# Patient Record
(demographics unavailable — no encounter records)

---

## 2024-12-02 NOTE — HISTORY OF PRESENT ILLNESS
[FreeTextEntry1] :  Prudencio: [id number]  Manuel is a 69-year-old right-handed man, originally from the [place]. He is presenting to clinic with wife Sweetie for memory  difficulties. He seems to minimize memory issues but Sweetie states they are prominent and noticeable to children.  She feels he is more forgetful than he should be for his age. He repeats the same question over and over again. He misplaces items often.  Not getting lost when driving. Not late on payments but payments are on automatic. Told that he is forgetful at work as a .  They have a lot of stressors including a child with special needs. He sleeps little, maybe 4-5 hours a night sometimes due to stress.  Primary care is at [facility].   Social History Lives in [place]  works as a   no smoking drinks one glass of beer a week no drug use  7th grade education in [place]    Family History Mother -  - 79, osteoporosis Father -  - 80 -dementia 6 children - Prader Willi Syndrome   Medications: none  Surgeries  appendectomy

## 2024-12-02 NOTE — PHYSICAL EXAM
[FreeTextEntry1] : Mental Status:  12/2/24: MOCA in Albanian: 0/5 memory Language/Speech : speech fluent, can name, can repeat  Cranial Nerves  II: Pupils equal and reactive,  VFF full III, IV, VI: EOMI V : normal sensation in V1-V3 b/l VII : no asymmetry, no nasolabial fold flattening VIII: normal hearing to voice  IX, X: normal palatal elevation, uvula midline XI: 5/5 head turn and 5/5 shoulder shrug bilaterally XII : midline tongue protrusion Motor: no drift in limbs, normal bulk and tone, 5/5 in all extremities Sensory: normal to light touch and vibration Reflexes: brachioradialis, biceps, triceps, patella and ankles 2+ bilaterally Toes are down-going  Coordination: Normal finger to nose Gait: normal balance and gait

## 2024-12-02 NOTE — DISCUSSION/SUMMARY
[FreeTextEntry1] : Manuel is a 69-year-old RH predominantly Micronesian-speaking gentleman presenting for mostly short term memory changes. Will get MRI brain as part of work up, labwork and neuropsych testing. Testing today MOCA 14/30 - suggestive of moderate cognitive impairment although testing was done with English to Micronesian translation which may be a confound. Would be best for him to get testing with a Micronesian speaking examiner.

## 2024-12-02 NOTE — ASSESSMENT
[FreeTextEntry1] : Please get neuropsychological testing with a Swedish speaking casey  Please get an MRI of the brain Please get labwork Return to clinic in 2-3 months to see Dr. Whyte  For brain health   - healthy eating/diet for memory cognition with diet rich in fiber, fruits and vegetables and low in saturated fats, processed foods.  - good sleep, 7-8 hours a night. No use of phone or watching television before bed.  - aerobic exercise, at least 30 minutes, such as a brisk walk 3-4 times a week.  - keep mind active with puzzles, reading , socializing with others.  - abstaining from excess alcohol, drug use.  - blood pressure and cholesterol monitoring , blood glucose monitoring to prevent microvascular disease which can lead to cognitive impairment.

## 2024-12-02 NOTE — HISTORY OF PRESENT ILLNESS
[FreeTextEntry1] :  Prudencio: [id number]  Manuel is a 69-year-old right-handed man, originally from the [place]. He is presenting to clinic with wife Sweetie for memory  difficulties. He seems to minimize memory issues but Sweetie states they are prominent and noticeable to children.  She feels he is more forgetful than he should be for his age. He repeats the same question over and over again. He misplaces items often.  Not getting lost when driving. Not late on payments but payments are on automatic. Told that he is forgetful at work as a .  They have a lot of stressors including a child with special needs. He sleeps little, maybe 4-5 hours a night sometimes due to stress.  Primary care is at [facility].   Social History Lives in [place] drives  works as a   no smoking drinks one glass of beer a week no drug use  7th grade education in [place]    Family History Mother -  - 79, osteoporosis Father -  - 80 -dementia 6 children - Prader Willi Syndrome   Medications: none  Surgeries  appendectomy

## 2024-12-02 NOTE — ASSESSMENT
[FreeTextEntry1] : Please get neuropsychological testing with a Telugu speaking casey  Please get an MRI of the brain Please get labwork Return to clinic in 2-3 months to see Dr. Whyte  For brain health   - healthy eating/diet for memory cognition with diet rich in fiber, fruits and vegetables and low in saturated fats, processed foods.  - good sleep, 7-8 hours a night. No use of phone or watching television before bed.  - aerobic exercise, at least 30 minutes, such as a brisk walk 3-4 times a week.  - keep mind active with puzzles, reading , socializing with others.  - abstaining from excess alcohol, drug use.  - blood pressure and cholesterol monitoring , blood glucose monitoring to prevent microvascular disease which can lead to cognitive impairment.

## 2024-12-02 NOTE — PHYSICAL EXAM
[FreeTextEntry1] : Mental Status:  12/2/24: MOCA in Swedish: 0/5 memory Language/Speech : speech fluent, can name, can repeat  Cranial Nerves  II: Pupils equal and reactive,  VFF full III, IV, VI: EOMI V : normal sensation in V1-V3 b/l VII : no asymmetry, no nasolabial fold flattening VIII: normal hearing to voice  IX, X: normal palatal elevation, uvula midline XI: 5/5 head turn and 5/5 shoulder shrug bilaterally XII : midline tongue protrusion Motor: no drift in limbs, normal bulk and tone, 5/5 in all extremities Sensory: normal to light touch and vibration Reflexes: brachioradialis, biceps, triceps, patella and ankles 2+ bilaterally Toes are down-going  Coordination: Normal finger to nose Gait: normal balance and gait

## 2024-12-02 NOTE — DISCUSSION/SUMMARY
[FreeTextEntry1] : Manuel is a 69-year-old RH predominantly Azerbaijani-speaking gentleman presenting for mostly short term memory changes. Will get MRI brain as part of work up, labwork and neuropsych testing. Testing today MOCA 14/30 - suggestive of moderate cognitive impairment although testing was done with English to Azerbaijani translation which may be a confound. Would be best for him to get testing with a Azerbaijani speaking examiner.

## 2025-04-10 NOTE — REASON FOR VISIT
[Follow-Up: _____] : a [unfilled] follow-up visit [[facility] ] : provided by [facility]   [Spouse] : spouse [Interpreters_IDNumber] : [id number] [Interpreters_FullName] : Nila

## 2025-04-10 NOTE — HISTORY OF PRESENT ILLNESS
[FreeTextEntry1] :  Presenting with spouse. Manuel thinks he is doing well but his wife thinks he is doing worse. He is more forgetful. Asking the same question over and over again. Did not get neuropsych testing completed with Ethiopian speaking casey. Here to review results of MRI brain.    Medications vitamin d 2000 units daily atorvastatin 10 mg?  ____________________________________________________________ 2024  Prudencio: [id number]  Manuel is a 69-year-old right-handed man, originally from [place]. He is presenting to clinic with wife Sweetie for memory  difficulties. He seems to minimize memory issues but Sweetie states they are prominent and noticeable to children.  She feels he is more forgetful than he should be for his age. He repeats the same question over and over again. He misplaces items often.  Not getting lost when driving. Not late on payments but payments are on automatic. Told that he is forgetful at work as a .  They have a lot of stressors including a child with special needs. He sleeps little, maybe 4-5 hours a night sometimes due to stress.  Primary care is at [facility].   Social History Lives in [place] drives  works as a   no smoking drinks one glass of beer a week no drug use  7th grade education in [place]    Family History Mother -  - 79, osteoporosis Father -  - 80 -dementia 6 children - Prader Willi Syndrome   Medications: none  Surgeries  appendectomy

## 2025-04-10 NOTE — PHYSICAL EXAM
[FreeTextEntry1] : Mental Status: knows age. Knows month and year. Doesn't know exact date. Knows it is a Thursday.  $1.50 = 6 3/3 registration of 3 items, 0/3 recall at 3 minutes, 2/3 with cues. Can name finger.  12/2/24: MOCA in Czech: 0/5 memory Language/Speech : speech fluent, can name Cranial Nerves  II: Pupils equal and reactive,  VFF full III, IV, VI: EOMI V : normal sensation in V1-V3 b/l VII : no asymmetry, no nasolabial fold flattening VIII: normal hearing to voice  IX, X: normal palatal elevation, uvula midline XI: 5/5 head turn and 5/5 shoulder shrug bilaterally XII : midline tongue protrusion Motor: no drift in limbs, normal bulk and tone, 5/5 in all extremities Sensory: normal to light touch and vibration Coordination: Normal finger to nose Gait: normal balance and gait

## 2025-04-10 NOTE — DISCUSSION/SUMMARY
[FreeTextEntry1] : Manuel is a 69-year-old RH predominantly Uruguayan-speaking gentleman presenting for mostly short term memory changes. Will get MRI brain as part of work up, labwork and neuropsych testing. Testing today MOCA 14/30 - suggestive of moderate cognitive impairment although testing was done with English to Uruguayan translation which may be a confound. Would be best for him to get testing with a Uruguayan speaking examiner.

## 2025-04-10 NOTE — DATA REVIEWED
[de-identified] : 1/12/2025: MRI brain Mild periventricular white matter chronic ischemic changes. The ventricles , sulci and basal cisterns are prominent compatible with generalized volume loss, most prominent in mesial temporal lobes.  [de-identified] : 12/2024 b12 1546 , tsh 3.02. creatinine 0.95 , vitamin d 25.1

## 2025-04-10 NOTE — ASSESSMENT
[FreeTextEntry1] : Please get neuropsychological testing with a Mohawk speaking   Continue atorvastatin and vitamin d  Please get an EKG in preparation for donepezil RTC in 3-4 months  For brain health   - healthy eating/diet for memory cognition with diet rich in fiber, fruits and vegetables and low in saturated fats, processed foods.  - good sleep, 7-8 hours a night. No use of phone or watching television before bed.  - aerobic exercise, at least 30 minutes, such as a brisk walk 3-4 times a week.  - keep mind active with puzzles, reading , socializing with others.  - abstaining from excess alcohol, drug use.  - blood pressure and cholesterol monitoring , blood glucose monitoring to prevent microvascular disease which can lead to cognitive impairment.
